# Patient Record
Sex: FEMALE | Race: WHITE | NOT HISPANIC OR LATINO | Employment: UNEMPLOYED | URBAN - METROPOLITAN AREA
[De-identification: names, ages, dates, MRNs, and addresses within clinical notes are randomized per-mention and may not be internally consistent; named-entity substitution may affect disease eponyms.]

---

## 2023-03-28 DIAGNOSIS — G43.909 MIGRAINE, UNSPECIFIED, NOT INTRACTABLE, WITHOUT STATUS MIGRAINOSUS: ICD-10-CM

## 2023-03-28 RX ORDER — NORTRIPTYLINE HYDROCHLORIDE 10 MG/1
CAPSULE ORAL
Qty: 90 CAPSULE | Refills: 1 | Status: SHIPPED | OUTPATIENT
Start: 2023-03-28 | End: 2023-09-22

## 2023-09-22 DIAGNOSIS — G43.909 MIGRAINE, UNSPECIFIED, NOT INTRACTABLE, WITHOUT STATUS MIGRAINOSUS: ICD-10-CM

## 2023-09-22 RX ORDER — NORTRIPTYLINE HYDROCHLORIDE 10 MG/1
CAPSULE ORAL
Qty: 90 CAPSULE | Refills: 1 | Status: SHIPPED | OUTPATIENT
Start: 2023-09-22 | End: 2024-05-21 | Stop reason: ALTCHOICE

## 2023-10-28 PROBLEM — R35.0 INCREASED URINARY FREQUENCY: Status: ACTIVE | Noted: 2023-10-28

## 2023-10-28 PROBLEM — R20.2 PARESTHESIA: Status: ACTIVE | Noted: 2023-10-28

## 2023-10-28 PROBLEM — L40.9 PSORIASIS: Status: ACTIVE | Noted: 2023-10-28

## 2023-10-28 PROBLEM — R42 VERTIGO: Status: ACTIVE | Noted: 2023-10-28

## 2023-10-28 PROBLEM — D22.9 MULTIPLE NEVI: Status: ACTIVE | Noted: 2023-10-28

## 2023-10-28 PROBLEM — H92.02 EARACHE ON LEFT: Status: ACTIVE | Noted: 2023-10-28

## 2023-10-28 PROBLEM — G90.A POTS (POSTURAL ORTHOSTATIC TACHYCARDIA SYNDROME): Status: ACTIVE | Noted: 2023-10-28

## 2023-10-28 PROBLEM — L40.50 PSORIATIC ARTHRITIS (MULTI): Status: ACTIVE | Noted: 2023-10-28

## 2023-10-28 PROBLEM — K59.09 CHRONIC CONSTIPATION: Status: ACTIVE | Noted: 2023-10-28

## 2023-10-28 PROBLEM — R55 NEAR SYNCOPE: Status: ACTIVE | Noted: 2023-10-28

## 2023-10-28 PROBLEM — G43.909 MIGRAINE: Status: ACTIVE | Noted: 2023-10-28

## 2023-10-28 PROBLEM — K21.9 GERD (GASTROESOPHAGEAL REFLUX DISEASE): Status: ACTIVE | Noted: 2023-10-28

## 2023-10-28 PROBLEM — M79.7 FIBROMYALGIA: Status: ACTIVE | Noted: 2023-10-28

## 2023-10-28 RX ORDER — MEDROXYPROGESTERONE ACETATE 150 MG/ML
1 INJECTION, SUSPENSION INTRAMUSCULAR
COMMUNITY
Start: 2019-01-07 | End: 2024-01-09 | Stop reason: SDUPTHER

## 2023-10-28 RX ORDER — MULTIVITAMIN
TABLET ORAL
COMMUNITY

## 2023-10-28 RX ORDER — PANTOPRAZOLE SODIUM 20 MG/1
1 TABLET, DELAYED RELEASE ORAL DAILY
COMMUNITY
Start: 2022-10-31 | End: 2023-11-21 | Stop reason: ALTCHOICE

## 2023-10-28 RX ORDER — LUBIPROSTONE 24 UG/1
24 CAPSULE, GELATIN COATED ORAL 2 TIMES DAILY
COMMUNITY
Start: 2021-04-12 | End: 2023-11-21

## 2023-10-28 RX ORDER — CLOBETASOL PROPIONATE 0.5 MG/G
CREAM TOPICAL
COMMUNITY
Start: 2021-06-25 | End: 2024-01-16 | Stop reason: WASHOUT

## 2023-10-28 RX ORDER — SUMATRIPTAN SUCCINATE 100 MG/1
TABLET ORAL
COMMUNITY
Start: 2019-03-07

## 2023-10-28 RX ORDER — PROPRANOLOL HYDROCHLORIDE 10 MG/1
10 TABLET ORAL 2 TIMES DAILY
COMMUNITY
Start: 2023-08-15 | End: 2023-11-21 | Stop reason: SINTOL

## 2023-10-28 RX ORDER — MIDODRINE HYDROCHLORIDE 5 MG/1
5 TABLET ORAL 3 TIMES DAILY
COMMUNITY
End: 2023-11-21 | Stop reason: SINTOL

## 2023-10-28 RX ORDER — LORATADINE 10 MG/1
1 TABLET ORAL DAILY
COMMUNITY
End: 2023-11-21

## 2023-10-30 ENCOUNTER — OFFICE VISIT (OUTPATIENT)
Dept: OBSTETRICS AND GYNECOLOGY | Facility: CLINIC | Age: 23
End: 2023-10-30

## 2023-10-30 VITALS
DIASTOLIC BLOOD PRESSURE: 78 MMHG | BODY MASS INDEX: 20.54 KG/M2 | WEIGHT: 131.13 LBS | SYSTOLIC BLOOD PRESSURE: 118 MMHG

## 2023-10-30 DIAGNOSIS — Z30.42 ENCOUNTER FOR MANAGEMENT AND INJECTION OF DEPO-PROVERA: Primary | ICD-10-CM

## 2023-10-30 PROCEDURE — 96372 THER/PROPH/DIAG INJ SC/IM: CPT | Performed by: OBSTETRICS & GYNECOLOGY

## 2023-10-30 RX ORDER — MEDROXYPROGESTERONE ACETATE 150 MG/ML
150 INJECTION, SUSPENSION INTRAMUSCULAR ONCE
Status: CANCELLED | OUTPATIENT
Start: 2023-10-30

## 2023-10-30 RX ORDER — MEDROXYPROGESTERONE ACETATE 150 MG/ML
150 INJECTION, SUSPENSION INTRAMUSCULAR ONCE
Status: COMPLETED | OUTPATIENT
Start: 2023-10-30 | End: 2023-10-30

## 2023-10-30 RX ADMIN — MEDROXYPROGESTERONE ACETATE 150 MG: 150 INJECTION, SUSPENSION INTRAMUSCULAR at 13:45

## 2023-10-30 NOTE — PROGRESS NOTES
Patient here today for Depo Provera injection.  Last Depo: 8/14/2023  Next Depo due: 1/15/2024-1/29/2024  Last RUDY: 5/25/2023  UPT: Not needed  LMP: No LMP recorded.  Complaints: None  Patient given depo without difficulties.  Patient provided or office supplied: Patient provided  Patient tolerated injection well.  Education offered.

## 2023-11-21 ENCOUNTER — OFFICE VISIT (OUTPATIENT)
Dept: RHEUMATOLOGY | Facility: CLINIC | Age: 23
End: 2023-11-21

## 2023-11-21 VITALS
DIASTOLIC BLOOD PRESSURE: 70 MMHG | BODY MASS INDEX: 20.81 KG/M2 | SYSTOLIC BLOOD PRESSURE: 102 MMHG | TEMPERATURE: 97.7 F | HEIGHT: 67 IN | HEART RATE: 92 BPM | WEIGHT: 132.6 LBS | OXYGEN SATURATION: 98 %

## 2023-11-21 DIAGNOSIS — M79.7 FIBROMYALGIA: Primary | ICD-10-CM

## 2023-11-21 DIAGNOSIS — G90.A POTS (POSTURAL ORTHOSTATIC TACHYCARDIA SYNDROME): ICD-10-CM

## 2023-11-21 PROBLEM — R20.9 DISTURBANCE OF SKIN SENSATION: Status: ACTIVE | Noted: 2020-07-09

## 2023-11-21 PROBLEM — M19.90 ARTHRITIS: Status: ACTIVE | Noted: 2023-11-21

## 2023-11-21 PROBLEM — R00.2 PALPITATIONS: Status: ACTIVE | Noted: 2022-02-11

## 2023-11-21 PROCEDURE — 1036F TOBACCO NON-USER: CPT | Performed by: INTERNAL MEDICINE

## 2023-11-21 PROCEDURE — 99212 OFFICE O/P EST SF 10 MIN: CPT | Performed by: INTERNAL MEDICINE

## 2023-11-21 RX ORDER — CETIRIZINE HYDROCHLORIDE 5 MG/1
10 TABLET ORAL DAILY
COMMUNITY

## 2023-11-21 RX ORDER — ALBUTEROL SULFATE 90 UG/1
2 AEROSOL, METERED RESPIRATORY (INHALATION) EVERY 4 HOURS PRN
COMMUNITY
Start: 2023-10-31

## 2023-11-21 ASSESSMENT — ENCOUNTER SYMPTOMS
DEPRESSION: 0
OCCASIONAL FEELINGS OF UNSTEADINESS: 0
LOSS OF SENSATION IN FEET: 0

## 2023-11-21 ASSESSMENT — PATIENT HEALTH QUESTIONNAIRE - PHQ9
SUM OF ALL RESPONSES TO PHQ9 QUESTIONS 1 AND 2: 0
1. LITTLE INTEREST OR PLEASURE IN DOING THINGS: NOT AT ALL
2. FEELING DOWN, DEPRESSED OR HOPELESS: NOT AT ALL

## 2023-11-21 NOTE — PROGRESS NOTES
Subjective   Patient ID: Corin Schneider is a 23 y.o. female who presents for follow up .    HPI 23-year-old female here for follow up regarding joint pain and possible raynaud's. She was started on naltrexone 4.5 mg daily for probable fibromyalgia 4/20, which has helped significantly.    Her feet in the past felt numb and tingly, but she is not really feeling this now. Dr. Covarrubias diagnosed peripheral neuropathy. He did labs and did not recommend any further testing at present. Vitamin B6 was slightly low on lab results (vitamin B6 was 17.8, with normal range 20 to 125). TSH, vitamin B12, copper and SPEP were normal.  Her feet seem to feel more tingly when she is cold.   She does note that her fingers turn somewhat white and blue with cold exposure exposure.     She complained of having joint pain since 2017. Pain involved her feet, ankles, knees and shoulders. Her pain seems to be worse for the day progresses. The pain started in her feet. She had 10-15 minutes of morning stiffness.  She had burning pain at base of neck, also radiates into shoulders. She had aching in pretibial area bilaterally.      She developed a patch on her back in 2017, for which she had a skin biopsy by dermatology.. She has a patch on right buttock. She is using topical clobetasol. She was diagnosed with psoriasis. She has some psoriasis posterior to her shoulders, also on her legs.     She tried taking methotrexate in 2017, which caused weight loss, nausea, and increased anxiety.  She tried folic acid and a medical food product called rheumate (which contains methyltetrahydrofolate, vitamin B-12, glucosamine, and curcumin.)      In 2019, she stopped methotrexate and tried hydroxychloroquine 200 mg daily. She initially thought this was helping but then no longer felt as though it is helping.   Last eye exam was 1/20.   She stopped hydroxychloroquine and gabapentin 4/20.     She also notes that her fingers and toes turn red and purple  with cold exposure. This has not recently been much of a problem .    She notes that she gave up her job at [employer], which she was finding to be stressful.  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  She notes that she knows how to do sewing and crocheting, and she helps make products for purchase.    Her cardiologist had her stop both propranolol and midodrine which she was taking for POTS.  She is having much less pain since she stopped the medication.  She no longer needs to take the naltrexone daily, she is only using it as needed.     She is not having as many migraine headaches.  She is no longer taking the nortriptyline daily .    She had a positive tilt table test done 2/22 by Dr. Mitchell ([facility]).  Her regular cardiologist is Dr. Gudino.  She gets occasional atypical chest pain.  She has not had recent syncope.     She had COVID 1/22, for which she recovered.     Echo done 11/22 left ventricle ejection fraction is 54% ±5%.  CT angio of coronary arteries done March 2023 shows normal coronary arteries. A PFO was noted with slight degree of left-to-right shunting. Left ventricle ejection fraction was estimated at 58%.     She had Pfizer COVID-19 vaccine April 25, 2021 and May 17, 2021.. She had booster January 15, 2022.     Labs March 2020: CMP normal except glucose 102, CBC normal except white blood cell count 4.2, REMINGTON negative, rheumatoid antifactor negative, citrulline antibody negative, ESR less than 1, CRP less than 0.1  Labs 5/20: Vitamin B12 and folic acid normal  Labs February 2023: CBC normal, CMP normal, ESR 2, CRP less than 0.3 (normal less than 0.9)  Labs 5/23: ESR 9, CRP less than 0.3 (less than 0.9), rheumatoid factor negative, D-dimer negative     Medical problem list:   - IBS-C (had negative colonoscopy in 2019)   - migraine headaches- migraine headaches    -Fibromyalgia   - ? Peripheral neuropathy     Surgeries: None.     Social history: Single.   Denies use of tobacco and alcohol.    "Currently a student in her final semester at [employer]. Works part-time in retail.     Family history: Paternal aunt has psoriasis.    Father- gout    ROS:  General: Denies fevers or chills.  CV: Denies  palpitations.  She gets occasional atypical chest pain Denies leg edema.  Lungs: Denies coughing or shortness of breath.  Skin: Denies rashes or nodules.  MS: Denies joint pain or joint swelling.     Objective   /70   Pulse 92   Temp 36.5 °C (97.7 °F)   Ht 1.702 m (5' 7\")   Wt 60.1 kg (132 lb 9.6 oz)   SpO2 98%   BMI 20.77 kg/m²     Physical Exam  HEENT: PERRL, EOMI  Neck: Supple, no nodes.  CV: RRR, no MGR.  Lungs: Clear, no rales or wheezes.  Abdomen: Soft, nontender. No hepatosplenomegaly.  Extremities:  No cyanosis, clubbing, or edema.  MS: No synovitis.  Skin: No rashes or nodules.      Assessment/Plan   Problem List Items Addressed This Visit             ICD-10-CM    Fibromyalgia - Primary M79.7    POTS (postural orthostatic tachycardia syndrome) G90.A           Fibromyalgia-symptoms are less severe since stopping midodrine and propranolol.  She also left her job at [employer] that she found stressful.  She is only taking the naltrexone intermittently as needed.         POTS-doing well off medication.  She had a positive tilt table test 2/22.    Raynaud's phenomenon. She is normal nailfold capillary to both fourth fingers.  Currently not very symptomatic.     Possible peripheral neuropathy- she saw Dr. Covarrubias. Vitamin B6 was slightly low but TSH, vitamin B-12, copper and SPEP were normal. Symptoms seem stable. She is currently wearing [employer] D[employer]. athletic shoes which have helped.     Atypical chest pain-she had full cardiology work-up in the past including echo November 2022 and CT angio of the coronary arteries March 2023 .  She did have a PFO with slight degree of left-to-right shunting .  Her cardiologist is Dr. Hameed.    BMI 20- stable.    Plan:  Follow-up in 6 months.         "

## 2024-01-03 ENCOUNTER — TELEPHONE (OUTPATIENT)
Dept: OBSTETRICS AND GYNECOLOGY | Facility: CLINIC | Age: 24
End: 2024-01-03

## 2024-01-09 DIAGNOSIS — Z30.42 ENCOUNTER FOR SURVEILLANCE OF INJECTABLE CONTRACEPTIVE: Primary | ICD-10-CM

## 2024-01-10 RX ORDER — MEDROXYPROGESTERONE ACETATE 150 MG/ML
150 INJECTION, SUSPENSION INTRAMUSCULAR
Qty: 1 ML | Refills: 1 | Status: SHIPPED | OUTPATIENT
Start: 2024-01-10

## 2024-01-16 ENCOUNTER — OFFICE VISIT (OUTPATIENT)
Dept: OBSTETRICS AND GYNECOLOGY | Facility: CLINIC | Age: 24
End: 2024-01-16
Payer: MEDICAID

## 2024-01-16 VITALS
HEIGHT: 67 IN | SYSTOLIC BLOOD PRESSURE: 98 MMHG | DIASTOLIC BLOOD PRESSURE: 60 MMHG | BODY MASS INDEX: 20.88 KG/M2 | WEIGHT: 133 LBS

## 2024-01-16 DIAGNOSIS — Z30.42 ENCOUNTER FOR MANAGEMENT AND INJECTION OF DEPO-PROVERA: ICD-10-CM

## 2024-01-16 PROCEDURE — 1036F TOBACCO NON-USER: CPT | Performed by: OBSTETRICS & GYNECOLOGY

## 2024-01-16 PROCEDURE — 96372 THER/PROPH/DIAG INJ SC/IM: CPT | Performed by: OBSTETRICS & GYNECOLOGY

## 2024-01-16 RX ORDER — NEBIVOLOL 5 MG/1
2.5 TABLET ORAL DAILY
COMMUNITY
Start: 2024-01-11 | End: 2024-05-21 | Stop reason: ALTCHOICE

## 2024-01-16 RX ORDER — MEDROXYPROGESTERONE ACETATE 150 MG/ML
150 INJECTION, SUSPENSION INTRAMUSCULAR ONCE
Status: COMPLETED | OUTPATIENT
Start: 2024-01-16 | End: 2024-01-16

## 2024-01-16 RX ADMIN — MEDROXYPROGESTERONE ACETATE 150 MG: 150 INJECTION, SUSPENSION INTRAMUSCULAR at 13:19

## 2024-01-16 NOTE — PROGRESS NOTES
Pt here today for Depo Provera IM Injection    Last depo: 10/30/2023  Next depo due: 4/3/2024 - 4/17/2024  Last RUDY:  5/25/2023  UPT (if done): N/A  LMP: Absent  Complaints with being on Depo Provera: None  Pt given Depo Provera without difficulty  Pt provided Depo   Pt tolerated injection well.  Education offered.    Julia Johnson MA II

## 2024-04-09 ENCOUNTER — CLINICAL SUPPORT (OUTPATIENT)
Dept: OBSTETRICS AND GYNECOLOGY | Facility: CLINIC | Age: 24
End: 2024-04-09
Payer: MEDICAID

## 2024-04-09 VITALS — WEIGHT: 135.4 LBS | DIASTOLIC BLOOD PRESSURE: 64 MMHG | SYSTOLIC BLOOD PRESSURE: 102 MMHG | BODY MASS INDEX: 21.21 KG/M2

## 2024-04-09 DIAGNOSIS — Z30.42 ENCOUNTER FOR MANAGEMENT AND INJECTION OF DEPO-PROVERA: Primary | ICD-10-CM

## 2024-04-09 PROCEDURE — 96372 THER/PROPH/DIAG INJ SC/IM: CPT

## 2024-04-09 RX ORDER — MEDROXYPROGESTERONE ACETATE 150 MG/ML
150 INJECTION, SUSPENSION INTRAMUSCULAR ONCE
Status: COMPLETED | OUTPATIENT
Start: 2024-04-09 | End: 2024-04-09

## 2024-04-09 RX ORDER — MEDROXYPROGESTERONE ACETATE 150 MG/ML
150 INJECTION, SUSPENSION INTRAMUSCULAR ONCE
Status: CANCELLED | OUTPATIENT
Start: 2024-04-09 | End: 2024-04-09

## 2024-04-09 RX ADMIN — MEDROXYPROGESTERONE ACETATE 150 MG: 150 INJECTION, SUSPENSION INTRAMUSCULAR at 15:20

## 2024-04-09 NOTE — PROGRESS NOTES
Patient here today for Depo injection    Last Depo: 1/16/2024  Next Depo due: 6/25-7/9  Last RUDY: 5/25/23  UPT result (if done): n/a  LMP: absent  Complaints with Depo: none  Patient or office supplied: pt   Patient given Depo with no difficulties  Patient tolerated injection well  Education offered

## 2024-05-21 ENCOUNTER — OFFICE VISIT (OUTPATIENT)
Dept: RHEUMATOLOGY | Facility: CLINIC | Age: 24
End: 2024-05-21
Payer: MEDICAID

## 2024-05-21 VITALS
OXYGEN SATURATION: 99 % | HEART RATE: 88 BPM | HEIGHT: 67 IN | BODY MASS INDEX: 21.6 KG/M2 | DIASTOLIC BLOOD PRESSURE: 80 MMHG | SYSTOLIC BLOOD PRESSURE: 122 MMHG | TEMPERATURE: 98 F | WEIGHT: 137.6 LBS

## 2024-05-21 DIAGNOSIS — M79.7 FIBROMYALGIA: Primary | ICD-10-CM

## 2024-05-21 PROBLEM — L40.50 PSORIATIC ARTHRITIS (MULTI): Status: RESOLVED | Noted: 2023-10-28 | Resolved: 2024-05-21

## 2024-05-21 PROCEDURE — 1036F TOBACCO NON-USER: CPT | Performed by: INTERNAL MEDICINE

## 2024-05-21 PROCEDURE — 99214 OFFICE O/P EST MOD 30 MIN: CPT | Performed by: INTERNAL MEDICINE

## 2024-05-21 PROCEDURE — 3008F BODY MASS INDEX DOCD: CPT | Performed by: INTERNAL MEDICINE

## 2024-05-21 NOTE — PATIENT INSTRUCTIONS
Since fibromyalgia symptoms have now improved and are not requiring medication at present, I recommend that you follow-up with me again only if your symptoms worsen.

## 2024-05-21 NOTE — PROGRESS NOTES
Subjective   Patient ID: Corin Schneider is a 24 y.o. female who presents for follow up .    HPI 24-year-old female here for follow up regarding joint pain and possible raynaud's.     She was started on naltrexone 4.5 mg daily for probable fibromyalgia 4/20, which did help significantly.  However, she now notes that she stopped naltrexone several months ago, without any recurrence of her previous pain symptoms.    Her feet in the past felt numb and tingly, but she is not really feeling this now. Dr. Covarrubias diagnosed peripheral neuropathy. He did labs and did not recommend any further testing at present. Vitamin B6 was slightly low on lab results (vitamin B6 was 17.8, with normal range 20 to 125). TSH, vitamin B12, copper and SPEP were normal.  She does note that her fingers turn somewhat white and blue with cold exposure exposure.     She previously complained of having joint pain since 2017. Pain involved her feet, ankles, knees and shoulders. Her pain seems to be worse for the day progresses. The pain started in her feet. She had 10-15 minutes of morning stiffness.  She had burning pain at base of neck, also radiates into shoulders. She had aching in pretibial area bilaterally.   Again, she currently notes she is not having the pain now.  She was able to stop naltrexone several months ago.    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  She finds her current job less stressful than her previous job, and less physically demanding.     She developed a patch on her back in 2017, for which she had a skin biopsy by dermatology.. She currently has a patch lateral to her right scapula . She is using topical clobetasol. She was diagnosed with psoriasis.      She tried taking methotrexate in 2017, which caused weight loss, nausea, and increased anxiety.  She tried folic acid and a medical food product called rheumate (which  contains methyltetrahydrofolate, vitamin B-12, glucosamine, and curcumin.)      In 2019, she stopped methotrexate and tried hydroxychloroquine 200 mg daily. She initially thought this was helping but then no longer felt as though it is helping.   Last eye exam was 1/20.   She stopped hydroxychloroquine and gabapentin 4/20.     She also notes that her fingers and toes turn red and purple with cold exposure. This has not recently been much of a problem .    Her cardiologist had her stop both propranolol and midodrine which she was taking for POTS.  She is having much less pain since she stopped the medication.  She no longer needs to take the naltrexone daily, she is only using it as needed.     She is not having as many migraine headaches.  She is no longer taking the nortriptyline.    She had a positive tilt table test done 2/22 by Dr. Mitchell ([facility]).  Her regular cardiologist is Dr. Gudino.  She gets occasional atypical chest pain.  She has not had recent syncope.     Echo done 11/22 left ventricle ejection fraction is 54% ±5%.  CT angio of coronary arteries done March 2023 shows normal coronary arteries. A PFO was noted with slight degree of left-to-right shunting. Left ventricle ejection fraction was estimated at 58%.     Labs March 2020: CMP normal except glucose 102, CBC normal except white blood cell count 4.2, REMINGTON negative, rheumatoid antifactor negative, citrulline antibody negative, ESR less than 1, CRP less than 0.1  Labs 5/20: Vitamin B12 and folic acid normal  Labs February 2023: CBC normal, CMP normal, ESR 2, CRP less than 0.3 (normal less than 0.9)  Labs 5/23: ESR 9, CRP less than 0.3 (less than 0.9), rheumatoid factor negative, D-dimer negative     Medical problem list:   - IBS-C (had negative colonoscopy in 2019)   - migraine headaches- migraine headaches    -Fibromyalgia   - ? Peripheral neuropathy     Surgeries: None.     Social history: Single.   Denies use of tobacco and alcohol.  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    family history: Paternal aunt has psoriasis.    Father- gout    ROS:  General: Denies fevers or chills.  CV: Denies  palpitations.  She gets occasional atypical chest pain Denies leg edema.  Lungs: Denies coughing or shortness of breath.  Skin: Denies rashes or nodules.  MS: Denies joint pain or joint swelling.     Objective   /80 (BP Location: Right arm, Patient Position: Sitting, BP Cuff Size: Small adult)   Pulse 88   Temp 36.7 °C (98 °F)   Ht 1.702 m (5' 7\")   Wt 62.4 kg (137 lb 9.6 oz)   SpO2 99%   BMI 21.55 kg/m²     Physical Exam  HEENT: PERRL, EOMI  Neck: Supple, no nodes.  CV: RRR, no MGR.  Lungs: Clear, no rales or wheezes.  Abdomen: Soft, nontender. No hepatosplenomegaly.  Extremities:  No cyanosis, clubbing, or edema.  MS: No synovitis.  Skin: No rashes or nodules.      Assessment/Plan   Problem List Items Addressed This Visit             ICD-10-CM    Fibromyalgia - Primary M79.7    BMI 21.0-21.9, adult Z68.21             Fibromyalgia-symptoms are less severe since stopping midodrine and propranolol.  She also left her job at [employer] that she found stressful.  She has not taken the naltrexone in several months, and has not had any recurrence of her symptoms.         POTS-doing well off medication.  She had a positive tilt table test 2/22.    Raynaud's phenomenon. She is normal nailfold capillary to both fourth fingers.  Currently not very symptomatic.     Possible peripheral neuropathy- she saw Dr. Covarrubias. Vitamin B6 was slightly low but TSH, vitamin B-12, copper and SPEP were normal. Symptoms seem stable. She is currently wearing [employer]. athletic shoes which have helped.     Atypical chest pain-she had full cardiology work-up in the past including echo November 2022 and CT angio of the coronary arteries March 2023 .  She did have a PFO with slight degree of left-to-right shunting .  Her cardiologist is Dr. Hameed.    BMI 21- " stable.    Plan:  Since fibromyalgia symptoms have now improved and are not requiring medication at present, I recommend that you follow-up with me again only if your symptoms worsen.

## 2024-06-14 DIAGNOSIS — Z30.42 ENCOUNTER FOR SURVEILLANCE OF INJECTABLE CONTRACEPTIVE: ICD-10-CM

## 2024-06-14 RX ORDER — MEDROXYPROGESTERONE ACETATE 150 MG/ML
150 INJECTION, SUSPENSION INTRAMUSCULAR
Qty: 1 ML | Refills: 1 | Status: SHIPPED | OUTPATIENT
Start: 2024-06-14

## 2024-06-25 ENCOUNTER — APPOINTMENT (OUTPATIENT)
Dept: OBSTETRICS AND GYNECOLOGY | Facility: CLINIC | Age: 24
End: 2024-06-25
Payer: MEDICAID

## 2024-06-25 VITALS
BODY MASS INDEX: 21.41 KG/M2 | HEIGHT: 67 IN | DIASTOLIC BLOOD PRESSURE: 77 MMHG | SYSTOLIC BLOOD PRESSURE: 109 MMHG | WEIGHT: 136.4 LBS

## 2024-06-25 DIAGNOSIS — N94.10 DYSPAREUNIA, FEMALE: Primary | ICD-10-CM

## 2024-06-25 DIAGNOSIS — Z30.42 ENCOUNTER FOR MANAGEMENT AND INJECTION OF DEPO-PROVERA: ICD-10-CM

## 2024-06-25 PROBLEM — R35.0 INCREASED URINARY FREQUENCY: Status: RESOLVED | Noted: 2023-10-28 | Resolved: 2024-06-25

## 2024-06-25 PROBLEM — M19.90 ARTHRITIS: Status: RESOLVED | Noted: 2023-11-21 | Resolved: 2024-06-25

## 2024-06-25 PROBLEM — H92.02 EARACHE ON LEFT: Status: RESOLVED | Noted: 2023-10-28 | Resolved: 2024-06-25

## 2024-06-25 PROCEDURE — 96372 THER/PROPH/DIAG INJ SC/IM: CPT | Performed by: ADVANCED PRACTICE MIDWIFE

## 2024-06-25 PROCEDURE — 99395 PREV VISIT EST AGE 18-39: CPT | Performed by: ADVANCED PRACTICE MIDWIFE

## 2024-06-25 PROCEDURE — 3008F BODY MASS INDEX DOCD: CPT | Performed by: ADVANCED PRACTICE MIDWIFE

## 2024-06-25 PROCEDURE — 1036F TOBACCO NON-USER: CPT | Performed by: ADVANCED PRACTICE MIDWIFE

## 2024-06-25 RX ORDER — MEDROXYPROGESTERONE ACETATE 150 MG/ML
150 INJECTION, SUSPENSION INTRAMUSCULAR ONCE
Status: COMPLETED | OUTPATIENT
Start: 2024-06-25 | End: 2024-06-25

## 2024-06-25 NOTE — PROGRESS NOTES
"Salma Schneider \"Reyna\" is a 24 y.o. female who is here for a routine well woman exam.     Concerns today:  Discuss family planning and contraception    No LMP recorded (lmp unknown). Patient has had an injection. Pt on Depo injections every 3 months.  Periods are  absent , lasting 0 days.   Dysmenorrhea:mild, occurring 1 or so before Depo due .   Cyclic symptoms include  mild back pain .     Sexual Activity: sexually active, male partners; Patient reports 1 partners in the last 12 months.  Pain with intercourse? Yes, soreness, especially after 20 mins, denies vaginal dryness  Loss of desire? No   Able to have an orgasm? Yes     History of prior STI: none    Current contraception: Depo-Provera injections    Last pap:   History of abnormal Pap smear: no  Treatment for cervical dysplasia: no  Received HPV vaccine series?: no, declines    Family history of breast cancer or ovarian cancer: yes - JACQUELINE had breast cancer in her early 40s    Menstrual History:  OB History          0    Para   0    Term   0       0    AB   0    Living   0         SAB   0    IAB   0    Ectopic   0    Multiple   0    Live Births   0                 Menarche age: 9  No LMP recorded (lmp unknown). Patient has had an injection.     Objective   /77   Ht 1.702 m (5' 7\")   Wt 61.9 kg (136 lb 6.4 oz)   LMP  (LMP Unknown)   BMI 21.36 kg/m²     Physical Exam  Constitutional:       Appearance: Normal appearance.   HENT:      Head: Normocephalic.      Nose: Nose normal.      Mouth/Throat:      Mouth: Mucous membranes are moist.      Pharynx: Oropharynx is clear.   Eyes:      Conjunctiva/sclera: Conjunctivae normal.   Cardiovascular:      Rate and Rhythm: Normal rate and regular rhythm.   Pulmonary:      Effort: Pulmonary effort is normal.      Breath sounds: Normal breath sounds.   Chest:      Comments: Declines CBE, pt has no concerns   Abdominal:      General: Abdomen is flat.      Palpations: Abdomen is soft. "   Genitourinary:     Comments: Pt declines pelvic exam, pt denies concerns at this time   Musculoskeletal:         General: Normal range of motion.      Cervical back: Normal range of motion and neck supple.   Skin:     General: Skin is warm and dry.   Neurological:      Mental Status: She is alert.   Psychiatric:         Mood and Affect: Mood normal.         Behavior: Behavior normal.          Assessment/Plan   Normal well woman exam  Continue healthy lifestyle habits  Consider taking a multivitamin daily  Continue recommended women's health screenings  Pap UTD, due 2026  Birth control maintenance  Continue Depo shots until pregnancy desired  Discussed possibility of delayed fertility  Discussed switching to alternate form of birth control, pt declines  Family planning  Advised stopping Depo 3-6 months prior to when she would like to start trying to conceive.  Start PNVs at least 3 months prior to trying  Start tracking periods with maryanne to make timing of ovulation and intercourse easier      Return to care for annual exam or sooner as needed.    JAILYN Pimentel-DAVID

## 2024-06-25 NOTE — PROGRESS NOTES
Pt. here today for Depo Provera injection  Last Depo 4/9/2024  Next Depo due 9/10-9/24/2024  Last RUDY today 6/25/2024  UPT n/a  LMP n/a  Complaints with being on Depo Provera  Pt. given Depo Provera without difficulties  Patient brought in.  Pt. tolerated injection well.  Education offered.

## 2024-08-20 NOTE — PROGRESS NOTES
Division of Minimally Invasive Gynecologic Surgery  [facility]    09/03/24 Gynecology Visit    CC:   Chief Complaint   Patient presents with    Endometriosis     NPV=  Endometriosis    Chaperone Declined: MADAN Smith is a 24 y.o. referred to our practice by Candice Healy    Patient using depo for contraception and menstrual regulation. Amenorrheic on depo. Prior to depo periods lasted 5 days. Pt report leg, back and pelvic cramping unrelieved with midol. Has to miss school the first 1-2 days.   Now that she is amenorrheic on depo she largely has no pain. Now has a back ache leading up to when her depo is due. On average 1 week prior to next depo dose.      GI symptoms: IBS largely in remission  Urinary symptoms: none  Sexual activity: yes, has pain with initial and deep penetration. Vaginal walls feel tender. Occasionally has deeper pelvic pain.     Prior Therapies: depo, last in June. OCPs-stopped due to migraines    Imaging: none  Labs: n/a     GYN Hx  Gynecologic history:  Contraception/menstrual regulation: Depo  Desires Childbearing: possibly  Last pap: NILM neg HPV 2023    OBHx: G0  Pertinent PMH: POTs, IBS  Pertient PSH: no surgeries    PMHx, PSHx, SHx, Allergies, and Medications updated in Epic.  Past Medical History:   Diagnosis Date    Migraine     Urinary tract infection, site not specified     UTI (lower urinary tract infection)     Past Surgical History:   Procedure Laterality Date    COLPOSCOPY  July 2019     Allergies   Allergen Reactions    Midodrine Shortness of breath    Propranolol Chills, Other and Shortness of breath    Amoxicillin Unknown    Nitroglycerin Unknown    Penicillins Unknown    Sulfa (Sulfonamide Antibiotics) Unknown    Sulfamethoxazole-Trimethoprim Unknown       Current Outpatient Medications:     albuterol 90 mcg/actuation inhaler, Inhale 2 puffs every 4 hours if needed for wheezing., Disp: , Rfl:      cetirizine (ZyrTEC) 5 mg tablet, Take 2 tablets (10 mg) by mouth once daily., Disp: , Rfl:     cyclobenzaprine (Flexeril) 5 mg tablet, Take 1 tablet (5 mg) by mouth 3 times a day for 10 days., Disp: 30 tablet, Rfl: 0    medroxyPROGESTERone 150 mg/mL injection, INJECT 1 ML (150 MG) INTO THE MUSCLE EVERY 12 WEEKS., Disp: 1 mL, Rfl: 1    multivitamin tablet, Take by mouth., Disp: , Rfl:     SUMAtriptan (Imitrex) 100 mg tablet, TAKE 1 TABLET AT ONSET OF MIGRAINE HEADACHE. MAY REPEAT IN 2 HOURS IF NEEDED., Disp: , Rfl:   Social History     Socioeconomic History    Marital status: Significant Other     Spouse name: Not on file    Number of children: Not on file    Years of education: Not on file    Highest education level: Not on file   Occupational History    Not on file   Tobacco Use    Smoking status: Never     Passive exposure: Never    Smokeless tobacco: Never   Vaping Use    Vaping status: Some Days    Substances: CBD   Substance and Sexual Activity    Alcohol use: Never    Drug use: Never    Sexual activity: Yes     Partners: Male     Birth control/protection: Injection   Other Topics Concern    Not on file   Social History Narrative    Not on file     Social Determinants of Health     Financial Resource Strain: Not on file   Food Insecurity: Not on file   Transportation Needs: Not on file   Physical Activity: Not on file   Stress: Not on file   Social Connections: Not on file   Intimate Partner Violence: Not on file   Housing Stability: Not on file     Family History   Problem Relation Name Age of Onset    Cholelithiasis Mother Shameka     Heart disease Mother Shameka     Other (gastroesophageal reflux disease) Father      Other (malignant neoplam of colon) Paternal Grandmother      Blood clot Maternal Grandfather Art     Heart disease Maternal Grandfather Art     Diabetes Maternal Grandmother Virginia     Heart disease Maternal Grandmother Virginia     Breast cancer Mother's Sister Skyla     Cancer Mother's Sister  "Skyla     Diabetes Mother's Sister Eryn     Heart disease Mother's Sister Eryn     Heart disease Mother's Brother Martin      OB History          0    Para   0    Term   0       0    AB   0    Living   0         SAB   0    IAB   0    Ectopic   0    Multiple   0    Live Births   0                    ROS: reviewed and negative    PE:/70   Ht 1.702 m (5' 7\")   Wt 63 kg (139 lb)   BMI 21.77 kg/m²   Gen: NAD  Psych: AAOx3  Skin: no lesions  Pulm: nonlabored breathing, normal respiratory effort  Cards: normal peripheral perfusion  Lymph: no LAD in axilla or groin  GI: soft, non-tender, non-distended, no rebound/guarding, no masses  :  External Genitalia: vulva without lesions, normal hair distribution  Urethral meatus: normal size and without massesBladder: non-tender, no masses or tenderness  Vagina: normal discharge, no lesions or masses. Significant tenderness with palpation of perineal body, right puborectalis, pubococcygeus, iliococcygeus and obturator internus. Contracted hypertonic bands. Left levator tenderness significant less than right side. Negative qtip test  Cervix: without discharge or lesions, no cervical masses, no CMT  Uterus: mildly ttp, mobile, normal sized  Adnexa: non tender and not enlarged  Anus/Perineum: normal appearance      A/P: Corin Schneider is a 24 y.o. with chronic pelvic pain, high tone pelvic floor and dyspareunia    High tone pelvic floor  The patient was counseled that pelvic pain may have many sources, and at times, pelvic floor muscle spasm can be a major contributor. The origin of pelvic floor muscle spasm can be multifactorial, including primary, reactive to a different pain source, trauma, or even part of a centralized pain syndrome. With regard to pelvic floor muscle spasm, her clinical history and exam findings support this diagnosis. Our primary intervention is typically pelvic floor physical therapy, and for some patients, the addition of local or " orally administered muscle relaxants, or centrally acting pain medications (e.g. Gabapentin, Cymbalta) can be effective. At this point, I will refer her for pelvic floor physical therapy and a prescription for flexeril was provided.    Pelvic pain  - We  discussed the multiple etiologies of chronic pelvic pain, including endometriosis, adenomyosis, GI etiologies, MSK etiologies, and centralization of pain.  - Her exam is most demonstrative of pelvic floor dysfunction but discussed cannot rule out possibility of endometriosis. Depo has largely controlled menstrual related pain and now most pain is related to sex  -Regarding endometriosis, we discussed the natural history of the disease, superficial endometriosis, endometriomas, and deeply infiltrating disease. Extensively reviewed the medical and surgical treatment options available for endometriosis including NSAIDs, OCPs, progestin therapy (Mirena IUD, depo-provera, norethindrone), GnRH agonists, GnRH antagonists, aromatase inhibitors (i.e. Letrozole), Orilissa (Elagolix tablets), as well as the surgical options including excision of endometriosis. We reviewed at length the modern treatment of endometriosis, recommending preservation of ovaries if normal, despite the presence of extensive disease due to morbidity and mortality benefits.  - We discussed the natural history of endometriosis and the fact that hormonal suppression is thought to have a role in slowing disease progression and managing symptoms of endometriosis, but cannot definitively reverse or eliminate endometriosis.   - continue depo provera  - US to evaluate for structural causes of pain  - Plan to reassess pain after PFPT and flexeril. Patient would like to avoid surgery and would like to assess pain response to pelvic floor therapy      RTC 3 months    Ladonna Sepulveda MD  Division of Minimally Invasive Gynecologic Surgery  [facility]

## 2024-09-03 ENCOUNTER — APPOINTMENT (OUTPATIENT)
Dept: OBSTETRICS AND GYNECOLOGY | Facility: CLINIC | Age: 24
End: 2024-09-03
Payer: MEDICAID

## 2024-09-03 VITALS
WEIGHT: 139 LBS | BODY MASS INDEX: 21.82 KG/M2 | HEIGHT: 67 IN | SYSTOLIC BLOOD PRESSURE: 112 MMHG | DIASTOLIC BLOOD PRESSURE: 70 MMHG

## 2024-09-03 DIAGNOSIS — N94.10 DYSPAREUNIA, FEMALE: Primary | ICD-10-CM

## 2024-09-03 DIAGNOSIS — M62.89 HIGH-TONE PELVIC FLOOR DYSFUNCTION: ICD-10-CM

## 2024-09-03 PROCEDURE — 99214 OFFICE O/P EST MOD 30 MIN: CPT | Performed by: STUDENT IN AN ORGANIZED HEALTH CARE EDUCATION/TRAINING PROGRAM

## 2024-09-03 PROCEDURE — 3008F BODY MASS INDEX DOCD: CPT | Performed by: STUDENT IN AN ORGANIZED HEALTH CARE EDUCATION/TRAINING PROGRAM

## 2024-09-03 PROCEDURE — 1036F TOBACCO NON-USER: CPT | Performed by: STUDENT IN AN ORGANIZED HEALTH CARE EDUCATION/TRAINING PROGRAM

## 2024-09-03 RX ORDER — CYCLOBENZAPRINE HCL 5 MG
5 TABLET ORAL 3 TIMES DAILY
Qty: 30 TABLET | Refills: 0 | Status: SHIPPED | OUTPATIENT
Start: 2024-09-03 | End: 2024-09-13

## 2024-09-03 ASSESSMENT — PAIN SCALES - GENERAL: PAINLEVEL: 0-NO PAIN

## 2024-09-05 ENCOUNTER — HOSPITAL ENCOUNTER (OUTPATIENT)
Dept: RADIOLOGY | Facility: HOSPITAL | Age: 24
Discharge: HOME | End: 2024-09-05
Payer: MEDICAID

## 2024-09-05 DIAGNOSIS — N94.10 DYSPAREUNIA, FEMALE: ICD-10-CM

## 2024-09-05 PROCEDURE — 76856 US EXAM PELVIC COMPLETE: CPT

## 2024-09-17 ENCOUNTER — APPOINTMENT (OUTPATIENT)
Dept: OBSTETRICS AND GYNECOLOGY | Facility: CLINIC | Age: 24
End: 2024-09-17
Payer: MEDICAID

## 2024-09-17 VITALS
SYSTOLIC BLOOD PRESSURE: 122 MMHG | WEIGHT: 141 LBS | HEIGHT: 67 IN | BODY MASS INDEX: 22.13 KG/M2 | DIASTOLIC BLOOD PRESSURE: 78 MMHG

## 2024-09-17 DIAGNOSIS — Z30.42 ENCOUNTER FOR MANAGEMENT AND INJECTION OF DEPO-PROVERA: ICD-10-CM

## 2024-09-17 DIAGNOSIS — Z30.42 ENCOUNTER FOR SURVEILLANCE OF INJECTABLE CONTRACEPTIVE: ICD-10-CM

## 2024-09-17 PROCEDURE — 96372 THER/PROPH/DIAG INJ SC/IM: CPT | Performed by: ADVANCED PRACTICE MIDWIFE

## 2024-09-17 PROCEDURE — 3008F BODY MASS INDEX DOCD: CPT | Performed by: ADVANCED PRACTICE MIDWIFE

## 2024-09-17 RX ORDER — MEDROXYPROGESTERONE ACETATE 150 MG/ML
150 INJECTION, SUSPENSION INTRAMUSCULAR
Qty: 1 ML | Refills: 1 | Status: CANCELLED | OUTPATIENT
Start: 2024-09-17

## 2024-09-17 RX ORDER — MEDROXYPROGESTERONE ACETATE 150 MG/ML
150 INJECTION, SUSPENSION INTRAMUSCULAR ONCE
Status: COMPLETED | OUTPATIENT
Start: 2024-09-17 | End: 2024-09-17

## 2024-09-17 NOTE — PROGRESS NOTES
Patient here for Depo injection.   Last injection given:  Medication supplied by office/Patient brought in own medication: Patient supplied  Injection given as documented. Patient tolerated well. Education provided.  UPT: Not needed  Last annual exam: 6/25/2024  LMP: absent  Patient to RTC between 12/3/2024-12/17/2024 for Depo   Patient verbalized understanding and all questions answered.

## 2024-09-18 DIAGNOSIS — Z30.42 ENCOUNTER FOR SURVEILLANCE OF INJECTABLE CONTRACEPTIVE: ICD-10-CM

## 2024-09-18 RX ORDER — MEDROXYPROGESTERONE ACETATE 150 MG/ML
150 INJECTION, SUSPENSION INTRAMUSCULAR
Qty: 1 ML | Refills: 3 | Status: SHIPPED | OUTPATIENT
Start: 2024-09-18

## 2024-10-15 ENCOUNTER — EVALUATION (OUTPATIENT)
Dept: PHYSICAL THERAPY | Facility: CLINIC | Age: 24
End: 2024-10-15
Payer: MEDICAID

## 2024-10-15 DIAGNOSIS — N94.10 DYSPAREUNIA, FEMALE: ICD-10-CM

## 2024-10-15 PROCEDURE — 97110 THERAPEUTIC EXERCISES: CPT | Mod: GP

## 2024-10-15 PROCEDURE — 97161 PT EVAL LOW COMPLEX 20 MIN: CPT | Mod: GP

## 2024-10-15 ASSESSMENT — PAIN - FUNCTIONAL ASSESSMENT: PAIN_FUNCTIONAL_ASSESSMENT: 0-10

## 2024-10-15 ASSESSMENT — PAIN SCALES - GENERAL: PAINLEVEL_OUTOF10: 5 - MODERATE PAIN

## 2024-10-15 NOTE — PROGRESS NOTES
"Physical Therapy    Physical Therapy Evaluation    Patient Name: Corin Schneider \"Reyna\"  MRN: [id number]  Today's Date: 10/15/2024   confirmed verbally by patient.    Time Entry:   Time Calculation  Start Time: 1008  Stop Time: 1100  Time Calculation (min): 52 min  PT Evaluation Time Entry  PT Evaluation (Low) Time Entry: 20  PT Therapeutic Procedures Time Entry  Therapeutic Exercise Time Entry: 32                    Reason for Visit:  Referred by: Ladonna Sepulveda MD (9/3/2024)   Referral DX: Dyspareunia, female [N94.10]     Insurance:  Visit: #1  Authorized: to be determined  Payor/Plan:   [employer] [facility] [facility] [facility]   [employer] 30 VISITS % COVERAGE AUTH REQ AFTER 30 VISITS     Current Problem  1. Dyspareunia, female  Referral to Physical Therapy          Subjective   Date of Onset: May 2024  Chief Complaint: pain with intercourse    Patient is a 24-year-old female presenting to PT with reports of painful sex difficulty engaging in sexual intercourse and sexual activities with fiance. Patient reports she did not become sexually active until about a 2 years and she always had some pain following penetration but recently the past few months it has become unbearable pain following sex with long \"flare ups\". Patient reports she is unsure why this is happening. Sometimes she reports flare ups are so bad she is unable to sit for a couple of days following intercourse. Is getting  in two weeks and would like this to no longer be a problem for them.    Additional history of cyst on labia minora- which has been cleared to be benign.   Currently on birth control: depo shot: progesterone, off estrogen pill due to migraines.   Hx of \"crooked tailbone\" found on imaging when she was little.   Hx of bad cramps as a child in lower abdomen. Unsure why.   Denies any changes in sensation or pain along external vaginal tissue.     Bladder Screen:  4-5 times voids/ day, no nocturia  No " "pain with urination  No straining, steady stream  68 oz water a day    Bowel Screen:  Does struggle on/off with constipation  No straining reported with BM  Sometimes has to injest a stimulant (grease) to induce BM  No leakage/incontinence reported  Mild pain at times in lower abdomen    Sexual Health Screen:   Pain with intercourse, penile penetration initially and when deeper  Has never utilized a tampon before due to difficulty with pain/insertion  No history of sexual trauma  Difficulty with climax  No difficulty with vaginal dryness     Recent Imagine/Lab Work:  US PELVIS TRANSABDOMINAL WITH TRANSVAGINAL:  \"IMPRESSION:  1.  Mild diffuse heterogeneity uterine myometrium with some increased  vascularity possibly representing adenomyosis. No discrete myometrial  lesion demonstrated.  2. No abnormal endometrial thickening.  3. Normal size of the ovaries containing physiologic cysts/follicles.  Blood flow demonstrated to both ovaries.\"    Patient stated goals for treatment: \"to have less pain\"    Reviewed medical screening history form with patient: Yes, none other likely to impact care.      Barriers Impacting Care:  None.    Precautions:  Precautions  STEADI Fall Risk Score (The score of 4 or more indicates an increased risk of falling): 0  Precautions Comment: Hx of POTS, migraine, fibromyalgia      Pain:  Pain Assessment: 0-10  0-10 (Numeric) Pain Score: 5 - Moderate pain  Pain Description: perineum, lower abdomen      Objective     Range of Motion:   Pelvic Floor AROM  Contract/Relax/Bulge  No/ No/ No    Patient demonstrates tons of difficulty engaging movement of perineum with PFM.     Hip: all WNL, slight pain with flexion over pressure in perineum  (Right)   Flexion   Extension  Abduction  IR  ER    (Left)  Flexion  Extension  Abduction  IR  ER     Strength:   Patient proves explicit verbal and continual consent during internal or external hands on work today.  Internal Pelvic Floor Strength Assessment " P/E//R///F; unable to produce Kegel contraction today.      Palpation:   Patient proves explicit verbal and continual consent during internal or hands on work today.  Internal Pelvic Floor Palpation  Vaginal Vestibule; no pain, tissue color WNL  Superficial Layer: high tone, pain with finger insertion  Intermediate Layer; high tone, pain   Deep Layer (Levator Ani/Obturator Internus); high tone right side> left, pain     Adductors Bilaterally: +TTP proximal palpation      TREATMENT  Educated patient on course of treatment.   Discussed PFM and high tone PFM dysfunction.     THERAPEUTIC EXERCISE:   Introduced:  See below for introduction to stretches for home. Discussed importance of breathing deeply, in nose out through mouth, for exercises.   Pt. Education: Discussed ordering vaginal dilators for use and for internal stretching at home. Ordering intimate bill small set.     OP Education: HEP: Patient verbalizes and demonstrates understanding of home exercises. Patient will contact writer if with questions or if condition worsens.     Access Code: [email or web address]  URL: [email or web address]  Date: 10/15/2024  Prepared by: Marilou Canales    Home Exercises:   - Supine Pelvic Floor Stretch  - 1 x daily - 7 x weekly - 1 sets - 10 reps - 30 second hold  - Deep Squat with Pelvic Floor Relaxation  - 1 x daily - 7 x weekly - 1 sets - 10 reps - 20 seconds hold  - Diaphragmatic Breathing in Supported Child's Pose with Pelvic Floor Relaxation  - 1 x daily - 7 x weekly - 1 sets - 1 reps - 5 minute hold    Charges: [id number], [id number]    Outcome Measures:  Other Measures  Other Outcome Measures: Female NIH-CPSI: 19/44     Assessment:   PT Diagnosis (Initial Evaluation 10/15/2024): Patient presents with pain, decreased mobility, strength, and function of pelvic floor muscles secondary to signs/symptoms suggestive of high tone pelvic floor dysfunction. Skilled PT required to maximize functional outcome. Likely to benefit  from skilled care to address concerns with focus on deep breathing/relaxation, internal/external stretching, and other modalities as needed.     Plan:  Treatment/Interventions: Aquatic therapy, Biofeedback, Cryotherapy, Dry needling, Education/ Instruction, Electrical stimulation, Hot pack, Manual therapy, Neuromuscular re-education, Self care/ home management, Therapeutic activities, Therapeutic exercises, Taping techniques  PT Plan: Skilled PT  PT Frequency: 1 time per week  Duration: 8 weeks  Rehab Potential: Good  Plan of Care Agreement: Patient    Next Visit Plan: Progress as tolerated. Begin dilator training.     Goals: update as needed  Patient will report confidence and independence with dilator training at home in 2 weeks.   Patient will report BM at least every 2 days in 8 weeks.   Patient will report pain-free sexual intercourse with penile penetration in 8 weeks.   Patient will report Female NIH-CPSI <5/44 in 8 weeks.   Patient will be independent with HEP.     Isabel Canales PT, DPT

## 2024-10-23 ENCOUNTER — TREATMENT (OUTPATIENT)
Dept: PHYSICAL THERAPY | Facility: CLINIC | Age: 24
End: 2024-10-23
Payer: MEDICAID

## 2024-10-23 DIAGNOSIS — M62.89 HIGH-TONE PELVIC FLOOR DYSFUNCTION: Primary | ICD-10-CM

## 2024-10-23 PROCEDURE — 97535 SELF CARE MNGMENT TRAINING: CPT | Mod: GP

## 2024-10-23 PROCEDURE — 97110 THERAPEUTIC EXERCISES: CPT | Mod: GP

## 2024-10-23 PROCEDURE — 97140 MANUAL THERAPY 1/> REGIONS: CPT | Mod: GP

## 2024-10-23 ASSESSMENT — PAIN SCALES - GENERAL: PAINLEVEL_OUTOF10: 3

## 2024-10-23 NOTE — PROGRESS NOTES
"Physical Therapy    Physical Therapy Treatment    Patient Name: Corin Schneider  MRN: [id number]  Today's Date: 10/23/2024    Time Entry:   Time Calculation  Start Time:   Stop Time:   Time Calculation (min): 58 min     PT Therapeutic Procedures Time Entry  Manual Therapy Time Entry: 30  Therapeutic Exercise Time Entry: 10  Self-Care/Home Mgmt Trainin                   Current Problem  1. High-tone pelvic floor dysfunction              Subjective    I am doing well, I feel like my pelvic floor muscles has been better relaxed prior to intercourse with use of happy baby stretch prior.   Less pain with insertion of penis when I did some of the stretches before. Excited to get  next week.     I have my vaginal dilators, ready to trial.     Patient reports a \"spot\" on inner labia that she realized she had in mirror the other week. Would like therapist to briefly look.       Precautions  Precautions  STEADI Fall Risk Score (The score of 4 or more indicates an increased risk of falling): 0  Precautions Comment: Hx of POTS, migraine, fibromyalgia  Pain  Pain Assessment  0-10 (Numeric) Pain Score: 3    Objective   Observation/Palpation:  Noted <.5cm size brown/black spot along right vulvar tissue adjacent to posterior fourchette of vaginal opening, irregular borders, not raised, no discharge bleeding or redness. No pain with palpation of skin spot +TTP with palpation of perirenal muscle body.    Treatments:    TREATMENT  Educated patient on course of treatment.   Discussed PFM and high tone PFM dysfunction.     MANUAL: x2  Introduction of vaginal dilator (light blue from medium intimate bill pack); therapist inserted while patient performed pelvic lengthening with diaphragmatic breathing. Process in total took 30 minutes. Patient spent 10 minutes with vaginal dilator completely inserted, supine with legs over small bolster for comfort. Additional use of soft meditative music utilized.     THERAPEUTIC " "EXERCISE: x1  Introduced:  Diaphragmatic breathing with PFM lengthening; 30 reps. During insertion of vaginal dilator as well.     SELF MANAGEMENT: x1  Discussed importance of lubrication and proper warm up prior to penetrative sexual intercourse; 10 minutes.   Discussed importance of continuing to monitor spot on vulvar tissue and to reach out to OBGYN as soon as possible for further evaluation.       OP Education: HEP: Patient verbalizes and demonstrates understanding of home exercises. Patient will contact writer if with questions or if condition worsens.      Access Code: [email or web address]  URL: [email or web address]  Date: 10/15/2024  Prepared by: Marilou Canales     Home Exercises:  - Supine Pelvic Floor Stretch  - 1 x daily - 7 x weekly - 1 sets - 10 reps - 30 second hold  - Deep Squat with Pelvic Floor Relaxation  - 1 x daily - 7 x weekly - 1 sets - 10 reps - 20 seconds hold  - Diaphragmatic Breathing in Supported Child's Pose with Pelvic Floor Relaxation  - 1 x daily - 7 x weekly - 1 sets - 1 reps - 5 minute hold     Charges: [id number], [id number], [id number]     Outcome Measures:  Other Measures  Other Outcome Measures: Female NIH-CPSI: 19/44      Assessment:   PT Diagnosis (Initial Evaluation 10/15/2024): Patient presents with pain, decreased mobility, strength, and function of pelvic floor muscles secondary to signs/symptoms suggestive of high tone pelvic floor dysfunction. Skilled PT required to maximize functional outcome. Likely to benefit from skilled care to address concerns with focus on deep breathing/relaxation, internal/external stretching, and other modalities as needed.     Today (10/23): Patient demonstrates ability to tolerate vaginal dilator internal stretching for 10 minutes. Continues to benefit from skilled care focused on high tone pelvic pain and strategies to reduce tone. Patient is advised to monitor \"spot\" observed on vulvar tissue as well as to reach out to OBGYN for further " "evaluation of \"spot\" to rule out any abnormality.     Plan:  Treatment/Interventions: Aquatic therapy, Biofeedback, Cryotherapy, Dry needling, Education/ Instruction, Electrical stimulation, Hot pack, Manual therapy, Neuromuscular re-education, Self care/ home management, Therapeutic activities, Therapeutic exercises, Taping techniques  PT Plan: Skilled PT  PT Frequency: 1 time per week  Duration: 8 weeks  Rehab Potential: Good  Plan of Care Agreement: Patient     Next Visit Plan: Progress as appropriate and tolerated. Trial biofeedback down training.      Goals: update as needed  Patient will report confidence and independence with dilator training at home in 2 weeks.   Patient will report BM at least every 2 days in 8 weeks.   Patient will report pain-free sexual intercourse with penile penetration in 8 weeks.   Patient will report Female NIH-CPSI <5/44 in 8 weeks.   Patient will be independent with HEP.     Isabel Canales PT, DPT  "

## 2024-11-05 ENCOUNTER — TREATMENT (OUTPATIENT)
Dept: PHYSICAL THERAPY | Facility: CLINIC | Age: 24
End: 2024-11-05
Payer: MEDICAID

## 2024-11-05 DIAGNOSIS — M62.89 HIGH-TONE PELVIC FLOOR DYSFUNCTION: Primary | ICD-10-CM

## 2024-11-05 DIAGNOSIS — N94.10 DYSPAREUNIA, FEMALE: ICD-10-CM

## 2024-11-05 PROCEDURE — 97140 MANUAL THERAPY 1/> REGIONS: CPT | Mod: GP

## 2024-11-05 PROCEDURE — 97110 THERAPEUTIC EXERCISES: CPT | Mod: GP

## 2024-11-05 ASSESSMENT — PAIN SCALES - GENERAL: PAINLEVEL_OUTOF10: 2

## 2024-11-05 NOTE — PROGRESS NOTES
"Physical Therapy    Physical Therapy Treatment    Patient Name: Corin Schneider  MRN: [id number]  Today's Date: 11/5/2024    Time Entry:   Time Calculation  Start Time: 1332  Stop Time: 1429  Time Calculation (min): 57 min     PT Therapeutic Procedures Time Entry  Manual Therapy Time Entry: 30  Therapeutic Exercise Time Entry: 27                   Current Problem  1. High-tone pelvic floor dysfunction [M62.89]        2. Dyspareunia, female [N94.10]            Subjective    I feel like stretches are helping.    Right now the most tenderness is at the end of intercourse.  Two weeks ago, I felt it in my stomach/lower abdomen hurting a little following dilator.     Otherwise I'm doing well. Will follow up with OBGYN today about vulvar spot.      Precautions  Precautions  STEADI Fall Risk Score (The score of 4 or more indicates an increased risk of falling): 0  Precautions Comment: Hx of POTS, migraine, fibromyalgia  Pain  Pain Assessment  0-10 (Numeric) Pain Score: 2    Objective     Treatments:    TREATMENT  Educated patient on course of treatment.   Discussed PFM and high tone PFM dysfunction.     MANUAL: x2  Skin rolling abdominal and visceral massage; focus on release of diaphragm, hip flexors, and TA. 20 minutes  Colonic pattern massage; I love you pattern; 10 minutes.   Diaphragmatic release seated; 5x on exhale.  Patient supine, legs over small bolster, eyes closed focused on breathing, meditation music playing in background, lights dimmed, heat applied to pelvic region. Clothes donned, gloves donned on therapist hands.    THERAPEUTIC EXERCISE: x1  Diaphragmatic breathing with PFM lengthening; 30 reps.   Modified marbin hip flexor stretch bilateral; 60 second holds; 4 times. (Left hip flexor worse than right)  Seated thoracic extension stretch on exhale over back of chair; 10 reps.     SELF MANAGEMENT: x1   Discussed progression of dilators at home.  Discussed importance of \"calming\" nervous system and promoting " "relaxing environment for breathing and stretching.   Taught and provided hand out of colonic massage for home.     OP Education: HEP: Patient verbalizes and demonstrates understanding of home exercises. Patient will contact writer if with questions or if condition worsens.      Access Code: [email or web address]  URL: [email or web address]  Date: 11/05/2024  Prepared by: Marilou Canales    Exercises:  - Supine Pelvic Floor Stretch  - 1 x daily - 7 x weekly - 1 sets - 10 reps - 30 second hold  - Deep Squat with Pelvic Floor Relaxation  - 1 x daily - 7 x weekly - 1 sets - 10 reps - 20 seconds hold  - Diaphragmatic Breathing in Supported Child's Pose with Pelvic Floor Relaxation  - 1 x daily - 7 x weekly - 1 sets - 1 reps - 5 minute hold  - Modified Adama Stretch  - 1 x daily - 7 x weekly - 1 sets - 10 reps - 60 second hold  - Lower Trunk Rotations  - 1 x daily - 7 x weekly - 3 sets - 10 reps     Charges: [id number], [id number], [id number]     Outcome Measures:  Other Measures  Other Outcome Measures: Female NIH-CPSI: 19/44      Assessment:   PT Diagnosis (Initial Evaluation 10/15/2024): Patient presents with pain, decreased mobility, strength, and function of pelvic floor muscles secondary to signs/symptoms suggestive of high tone pelvic floor dysfunction. Skilled PT required to maximize functional outcome. Likely to benefit from skilled care to address concerns with focus on deep breathing/relaxation, internal/external stretching, and other modalities as needed.     Today: Patient progressing well and as expected. Noted increase \"tightness\" of left hip flexors today as well as palpable tenderness and stool back up in descending/sigmoid colon. Patient encouraged to continue with colonic massage at home, breathing, stretches, and dilator use. Patient continues to benefit from skilled care to address concerns.      Plan:  Treatment/Interventions: Aquatic therapy, Biofeedback, Cryotherapy, Dry needling, Education/ Instruction, " "Electrical stimulation, Hot pack, Manual therapy, Neuromuscular re-education, Self care/ home management, Therapeutic activities, Therapeutic exercises, Taping techniques  PT Plan: Skilled PT  PT Frequency: 1 time per week  Duration: 8 weeks  Rehab Potential: Good  Plan of Care Agreement: Patient     Next Visit Plan: Progress as appropriate and tolerated.    Message to OBGYN:  \"\"Good morning, my name is Isabel I'm a pelvic floor therapist working with mutual patient Reyna Schneider. She is progressing well in pelvic PT with work on high tension pelvic floor and muscle weakness. Our last visit she reported strange spot noted on inner vulvar tissue, I observed spot and recommended she contact you as soon as possible to get area further inspected/evaluated. I'm concerned with how this area looks. \"Noted <.5cm size brown/black spot along right vulvar tissue adjacent to posterior fourchette of vaginal opening, irregular borders, not raised, no discharge bleeding or redness. No pain with palpation of skin spot +TTP with palpation of perirenal muscle body.\" Just giving heads up she will be contacting your office shortly. Thanks! -Isabel\"\"     Goals: update as needed  Patient will report confidence and independence with dilator training at home in 2 weeks.   Patient will report BM at least every 2 days in 8 weeks.   Patient will report pain-free sexual intercourse with penile penetration in 8 weeks.   Patient will report Female NIH-CPSI <5/44 in 8 weeks.   Patient will be independent with HEP.     Isabel Canales PT, DPT  "

## 2024-11-12 ENCOUNTER — TREATMENT (OUTPATIENT)
Dept: PHYSICAL THERAPY | Facility: CLINIC | Age: 24
End: 2024-11-12
Payer: MEDICAID

## 2024-11-12 DIAGNOSIS — M62.89 HIGH-TONE PELVIC FLOOR DYSFUNCTION: Primary | ICD-10-CM

## 2024-11-12 DIAGNOSIS — N94.10 DYSPAREUNIA, FEMALE: ICD-10-CM

## 2024-11-12 PROCEDURE — 97112 NEUROMUSCULAR REEDUCATION: CPT | Mod: GP

## 2024-11-12 PROCEDURE — 97110 THERAPEUTIC EXERCISES: CPT | Mod: GP

## 2024-11-12 ASSESSMENT — PAIN SCALES - GENERAL: PAINLEVEL_OUTOF10: 1

## 2024-11-12 NOTE — PROGRESS NOTES
Physical Therapy    Physical Therapy Treatment    Patient Name: Corin Schneider  MRN: [id number]  Today's Date: 11/12/2024    Time Entry:   Time Calculation  Start Time: 1430  Stop Time: 1527  Time Calculation (min): 57 min     PT Therapeutic Procedures Time Entry  Neuromuscular Re-Education Time Entry: 30  Therapeutic Exercise Time Entry: 27                   Current Problem  1. High-tone pelvic floor dysfunction [M62.89]        2. Dyspareunia, female [N94.10]              Subjective    Having less pain with intercourse, not sore by the time we're done.     75-80% better overall  Tried a position that was normally painful during intercourse that was fine this week    Have continued to monitor spot on vulva that we found last week. Do have visit with OBGYN 01/07    Precautions  Precautions  STEADI Fall Risk Score (The score of 4 or more indicates an increased risk of falling): 0  Precautions Comment: Hx of POTS, migraine, fibromyalgia  Pain  Pain Assessment  0-10 (Numeric) Pain Score: 1    Objective     Noted <.5cm size brown/black spot along right vulvar tissue adjacent to posterior fourchette of vaginal opening, irregular borders, not raised, no discharge bleeding or redness. CHANGE- PAIN WITH PALPATION TODAY INTO VULVAR SPOT.    Treatments:    TREATMENT  Educated patient on course of treatment.   Discussed PFM and high tension PFM dysfunction.       NEURO RE-EDUCATION: x2   Biofeedback with rectal sensor; inserted by therapist gloves donned. Cued patient to inhale during insertion.  Baseline: diaphragmatic breathing focus on lengthening pelvic floor on inhale.  30 Second Work, 30 Second Rest; work is lengthening actively, rest is relaxing. Patient tolerates well focusing on improved coordination of pelvic floor to relax and bulge.     THERAPEUTIC EXERCISE: x2  Child's pose rocking back from quadruped; 30 reps.   Butterfly adductor stretch in supine; 5 minute hold.  Patient Education:    Discussed importance of  "continued to monitoring of vulvar \"spot\".  Changes to HEP.   All questions answered by patient.      OP Education: HEP: Patient verbalizes and demonstrates understanding of home exercises. Patient will contact writer if with questions or if condition worsens.      Access Code: [email or web address]  URL: [email or web address]  Date: 11/05/2024  Prepared by: Marilou Canales    Home Exercises:  - Supine Pelvic Floor Stretch  - 1 x daily - 7 x weekly - 1 sets - 10 reps - 30 second hold  - Deep Squat with Pelvic Floor Relaxation  - 1 x daily - 7 x weekly - 1 sets - 10 reps - 20 seconds hold  - Diaphragmatic Breathing in Supported Child's Pose with Pelvic Floor Relaxation  - 1 x daily - 7 x weekly - 1 sets - 1 reps - 5 minute hold  - Modified Adama Stretch  - 1x daily - 7 x weekly - 1 sets - 10 reps - 60 second hold  - Lower Trunk Rotations  - 1 x daily - 7 x weekly - 3 sets - 10 reps     Charges: [id number], [id number]     Outcome Measures:  Other Measures  Other Outcome Measures: Female NIH-CPSI: 19/44      Assessment:   PT Diagnosis (Initial Evaluation 10/15/2024): Patient presents with pain, decreased mobility, strength, and function of pelvic floor muscles secondary to signs/symptoms suggestive of high tone pelvic floor dysfunction. Skilled PT required to maximize functional outcome. Likely to benefit from skilled care to address concerns with focus on deep breathing/relaxation, internal/external stretching, and other modalities as needed.     Today: Patient progressing well and as expected. Patient continues to present with vulvar \"spot\" and now increased pain seemingly unrelated to high tension pelvic floor upon palpation. Will be contacting physician again to report concerns. Patient continues to benefit from skilled care to address pelvic physical therapy related dysfunction.      Plan:  Treatment/Interventions: Aquatic therapy, Biofeedback, Cryotherapy, Dry needling, Education/ Instruction, Electrical " "stimulation, Hot pack, Manual therapy, Neuromuscular re-education, Self care/ home management, Therapeutic activities, Therapeutic exercises, Taping techniques  PT Plan: Skilled PT  PT Frequency: 1 time per week  Duration: 8 weeks  Rehab Potential: Good  Plan of Care Agreement: Patient     Next Visit Plan: Progress as appropriate and tolerated.    Message to OBGYN:  \"\"Good morning, my name is Isabel I'm a pelvic floor therapist working with mutual patient Reyna Schneider. She is progressing well in pelvic PT with work on high tension pelvic floor and muscle weakness. Our last visit she reported strange spot noted on inner vulvar tissue, I observed spot and recommended she contact you as soon as possible to get area further inspected/evaluated. I'm concerned with how this area looks. \"Noted <.5cm size brown/black spot along right vulvar tissue adjacent to posterior fourchette of vaginal opening, irregular borders, not raised, no discharge bleeding or redness. No pain with palpation of skin spot +TTP with palpation of perirenal muscle body.\" Just giving heads up she will be contacting your office shortly. Thanks! -Isabel\"\"     Goals: update as needed  Patient will report confidence and independence with dilator training at home in 2 weeks.   Patient will report BM at least every 2 days in 8 weeks.   Patient will report pain-free sexual intercourse with penile penetration in 8 weeks.   Patient will report Female NIH-CPSI <5/44 in 8 weeks.   Patient will be independent with HEP.     Isabel Canales PT, DPT  "

## 2024-11-19 ENCOUNTER — TREATMENT (OUTPATIENT)
Dept: PHYSICAL THERAPY | Facility: CLINIC | Age: 24
End: 2024-11-19
Payer: MEDICAID

## 2024-11-19 DIAGNOSIS — M62.89 HIGH-TONE PELVIC FLOOR DYSFUNCTION: Primary | ICD-10-CM

## 2024-11-19 PROCEDURE — 97140 MANUAL THERAPY 1/> REGIONS: CPT | Mod: GP

## 2024-11-19 PROCEDURE — 97110 THERAPEUTIC EXERCISES: CPT | Mod: GP

## 2024-11-19 ASSESSMENT — PAIN SCALES - GENERAL: PAINLEVEL_OUTOF10: 1

## 2024-11-19 NOTE — PROGRESS NOTES
Physical Therapy    Physical Therapy Treatment    Patient Name: Corin Schneider  MRN: [id number]  Today's Date: 11/19/2024    Time Entry:   Time Calculation  Start Time: 1420  Stop Time: 1508  Time Calculation (min): 48 min     PT Therapeutic Procedures Time Entry  Manual Therapy Time Entry: 10  Therapeutic Exercise Time Entry: 38                   Current Problem  1. High-tone pelvic floor dysfunction [M62.89]              Subjective    Trying to get on husbands insurance.   Did the butterfly but I noticed the hip has been bothering me since David.   Pain has been good, one incident where we did have intercourse.     Precautions  Precautions  STEADI Fall Risk Score (The score of 4 or more indicates an increased risk of falling): 0  Precautions Comment: Hx of POTS, migraine, fibromyalgia  Pain  Pain Assessment  0-10 (Numeric) Pain Score: 1    Objective   Limited thoracic spine extension    TREATMENT  Educated patient on course of treatment.   Discussed PFM and high tension PFM dysfunction.     MANUAL: x1  Grade II PA mob L4-L5; 5 minutes for pain relief and mobility.   Grade V thrust T10-T12; capitation produced; patient in prone; patient reports relief.   Moist heat applied before.     After manual  THERAPEUTIC EXERCISE: x2  Lower trunk rotations; 30 reps.   Side lying thoracic rotation with open book; 3 x 10 reps.   Thoracic extension over a foam roll; breath in relax, exhale and gentle tighten lower core; 20 reps.   Update HEP. Discussed correlation between thoracic spine, lumbar spine, paraspinals and pelvic floor tension.       OP Education: HEP: Patient verbalizes and demonstrates understanding of home exercises. Patient will contact writer if with questions or if condition worsens.      Access Code: [email or web address]  URL: [email or web address]  Date: 11/19/2024  Prepared by: Marilou Canales    Exercises  - Supine Pelvic Floor Stretch  - 1 x daily - 7 x weekly - 1 sets - 10 reps - 30 second hold  -  "Deep Squat with Pelvic Floor Relaxation  - 1 x daily - 7 x weekly - 1 sets - 10 reps - 20 seconds hold  - Diaphragmatic Breathing in Supported Child's Pose with Pelvic Floor Relaxation  - 1 x daily - 7 x weekly - 1 sets - 1 reps - 5 minute hold  - Modified Adama Stretch  - 1 x daily - 7 x weekly - 1 sets - 10 reps - 60 second hold  - Lower Trunk Rotations  - 1 x daily - 7 x weekly - 3 sets - 10 reps  - Supported Butterfly Stretch with Pelvic Floor Relaxation  - 1 x daily - 7 x weekly - 1 sets - 1 reps - 5 minutes hold  - Child's Pose Stretch  - 1 x daily - 7 x weekly - 1 sets - 10 reps  - Sidelying Thoracic Rotation with Open Book  - 1 x daily - 7 x weekly - 3 sets - 10 reps     Charges: [id number], 97110x2       Outcome Measures:  Other Measures  Other Outcome Measures: Female NIH-CPSI: 19/44      Assessment:   PT Diagnosis (Initial Evaluation 10/15/2024): Patient presents with pain, decreased mobility, strength, and function of pelvic floor muscles secondary to signs/symptoms suggestive of high tone pelvic floor dysfunction. Skilled PT required to maximize functional outcome. Likely to benefit from skilled care to address concerns with focus on deep breathing/relaxation, internal/external stretching, and other modalities as needed.     Today: Patient progressing well and as expected. Patient continues to present with vulvar \"spot\" and now increased pain seemingly unrelated to high tension pelvic floor upon palpation. Will be contacting physician again to report concerns. Patient reports relives and benefits from techniques to decrease tension up the chain from pelvic floor in upper and lower back. Patient continues to benefit from skilled care to address pelvic physical therapy related dysfunction.      Plan:  Treatment/Interventions: Aquatic therapy, Biofeedback, Cryotherapy, Dry needling, Education/ Instruction, Electrical stimulation, Hot pack, Manual therapy, Neuromuscular re-education, Self care/ home management, " Therapeutic activities, Therapeutic exercises, Taping techniques  PT Plan: Skilled PT  PT Frequency: 1 time per week  Duration: 8 weeks  Rehab Potential: Good  Plan of Care Agreement: Patient     Next Visit Plan: Progress as appropriate and tolerated.      Goals: update as needed  Patient will report confidence and independence with dilator training at home in 2 weeks.   Patient will report BM at least every 2 days in 8 weeks.   Patient will report pain-free sexual intercourse with penile penetration in 8 weeks.   Patient will report Female NIH-CPSI <5/44 in 8 weeks.   Patient will be independent with HEP.     Isabel Canales PT, DPT

## 2024-11-25 ENCOUNTER — TREATMENT (OUTPATIENT)
Dept: PHYSICAL THERAPY | Facility: CLINIC | Age: 24
End: 2024-11-25
Payer: MEDICAID

## 2024-11-25 DIAGNOSIS — N94.10 DYSPAREUNIA, FEMALE: ICD-10-CM

## 2024-11-25 PROCEDURE — 97110 THERAPEUTIC EXERCISES: CPT | Mod: GP

## 2024-11-25 PROCEDURE — 97140 MANUAL THERAPY 1/> REGIONS: CPT | Mod: GP

## 2024-11-25 ASSESSMENT — PAIN SCALES - GENERAL: PAINLEVEL_OUTOF10: 1

## 2024-11-25 NOTE — PROGRESS NOTES
Physical Therapy    Physical Therapy Treatment    Patient Name: Corin Schneider  MRN: [id number]  Today's Date: 11/25/2024    Time Entry:   Time Calculation  Start Time: 1731  Stop Time: 1820  Time Calculation (min): 49 min     PT Therapeutic Procedures Time Entry  Manual Therapy Time Entry: 15  Therapeutic Exercise Time Entry: 34                   Current Problem  1. Dyspareunia, female  Follow Up In Physical Therapy          Subjective    I wasn't too good with exercises the past week.   Tried to have intercourse, but couldn't because of pain. Penetration soreness was better.     Lower back pain with sitting side ways, mainly in the lower back pain.   Lower back pain last night.     Hip is still hurting with walking; right hip. Hip flexor region.     Medicaid runs out end of November    Precautions  Precautions  STEADI Fall Risk Score (The score of 4 or more indicates an increased risk of falling): 0  Precautions Comment: Hx of POTS, migraine, fibromyalgia  Pain  Pain Assessment  0-10 (Numeric) Pain Score: 1    Objective   Limited thoracic spine extension    TREATMENT  Educated patient on course of treatment.   Discussed PFM and high tension PFM dysfunction.     MANUAL: x1  Right hip flexor, patient in supine legs out straight, IASTM release. 15 minutes  Moist heat applied before.       THERAPEUTIC EXERCISE: x2  Hip flexor isometrics, knees bent up; 10 second hold against hand 10-25% effort (gentle); 2 x 20 reps.   Lower trunk rotations; 3 x 10 reps.   Patient Education:  Discussed plan until next PT visit.   Answered all questions.       OP Education: HEP: Patient verbalizes and demonstrates understanding of home exercises. Patient will contact writer if with questions or if condition worsens.      Access Code: [email or web address]  URL: [email or web address]  Date: 11/19/2024  Prepared by: Marilou Canales    Exercises  - Supine Pelvic Floor Stretch  - 1 x daily - 7 x weekly - 1 sets - 10 reps - 30 second  hold  - Deep Squat with Pelvic Floor Relaxation  - 1 x daily - 7 x weekly - 1 sets - 10 reps - 20 seconds hold  - Diaphragmatic Breathing in Supported Child's Pose with Pelvic Floor Relaxation  - 1 x daily - 7 x weekly - 1 sets - 1 reps - 5 minute hold  - Modified Adama Stretch  - 1 x daily - 7 x weekly - 1 sets - 10 reps - 60 second hold  - Lower Trunk Rotations  - 1 x daily - 7 x weekly - 3 sets - 10 reps  - Supported Butterfly Stretch with Pelvic Floor Relaxation  - 1 x daily - 7 x weekly - 1 sets - 1 reps - 5 minutes hold  - Child's Pose Stretch  - 1 x daily - 7 x weekly - 1 sets - 10 reps  - Sidelying Thoracic Rotation with Open Book  - 1 x daily - 7 x weekly - 3 sets - 10 reps     Charges: [id number], [id number]       Outcome Measures:  Other Measures  Other Outcome Measures: Female NIH-CPSI: 19/44      Assessment:   PT Diagnosis (Initial Evaluation 10/15/2024): Patient presents with pain, decreased mobility, strength, and function of pelvic floor muscles secondary to signs/symptoms suggestive of high tone pelvic floor dysfunction. Skilled PT required to maximize functional outcome. Likely to benefit from skilled care to address concerns with focus on deep breathing/relaxation, internal/external stretching, and other modalities as needed.     Today: Patient continues to present with right hip flexor pain ever since trip to [facility] with lots of walking. Patient demonstrates signs/symptoms of mild hip flexor strain benefiting from gentle STM and isometrics to reduce discomfort. Slight regression noted with pelvic pain progress due to onset of hip pain. Overall, patient pelvic symptoms are still considerably better since initial visit. Patient is able to have intercourse relatively pain free. Patient continues to benefit from skilled care and will benefit from future session to assess and advance progress.       Plan:  Treatment/Interventions: Aquatic therapy, Biofeedback, Cryotherapy, Dry needling, Education/  Instruction, Electrical stimulation, Hot pack, Manual therapy, Neuromuscular re-education, Self care/ home management, Therapeutic activities, Therapeutic exercises, Taping techniques  PT Plan: Skilled PT  PT Frequency: 1 time per week  Duration: 8 weeks  Rehab Potential: Good  Plan of Care Agreement: Patient     Next Visit Plan: Progress as appropriate and tolerated. Will follow up in 1 month when patient has insurance coverage, will see sooner if needed. Patient will remain in contact with provider through  email.       Goals: update as needed  Patient will report confidence and independence with dilator training at home in 2 weeks.   Patient will report BM at least every 2 days in 8 weeks.   Patient will report pain-free sexual intercourse with penile penetration in 8 weeks.   Patient will report Female NIH-CPSI <5/44 in 8 weeks.   Patient will be independent with HEP.     Isabel Canales PT, DPT

## 2024-12-03 ENCOUNTER — APPOINTMENT (OUTPATIENT)
Dept: OBSTETRICS AND GYNECOLOGY | Facility: CLINIC | Age: 24
End: 2024-12-03
Payer: MEDICAID

## 2024-12-03 VITALS
SYSTOLIC BLOOD PRESSURE: 121 MMHG | DIASTOLIC BLOOD PRESSURE: 84 MMHG | BODY MASS INDEX: 21.7 KG/M2 | WEIGHT: 138.25 LBS | HEIGHT: 67 IN

## 2024-12-03 DIAGNOSIS — Z30.42 ENCOUNTER FOR MANAGEMENT AND INJECTION OF DEPO-PROVERA: Primary | ICD-10-CM

## 2024-12-03 PROCEDURE — 3008F BODY MASS INDEX DOCD: CPT | Performed by: ADVANCED PRACTICE MIDWIFE

## 2024-12-03 PROCEDURE — 96372 THER/PROPH/DIAG INJ SC/IM: CPT | Performed by: ADVANCED PRACTICE MIDWIFE

## 2024-12-03 RX ORDER — MEDROXYPROGESTERONE ACETATE 150 MG/ML
150 INJECTION, SUSPENSION INTRAMUSCULAR ONCE
Status: COMPLETED | OUTPATIENT
Start: 2024-12-03 | End: 2024-12-03

## 2024-12-03 NOTE — PROGRESS NOTES
Pt. here today for Depo Provera injection  Last Depo 9/17/2024  Next Depo due 2/18-3/4/2025  Last RUDY 6/25/2024  UPT n/a  LMP n/a  Pt. given Depo Provera without difficulties.  Patient brought in.  Pt. tolerated injection well.  Education offered.

## 2024-12-12 NOTE — PROGRESS NOTES
Division of Minimally Invasive Gynecologic Surgery  [facility]    12/18/24 Gynecology Visit    CC:   Chief Complaint   Patient presents with    Vaginitis/Bacterial Vaginosis     Declined chaperone  Reviewed and approved by LOUISE BHAKTA on 12/18/24 at 11:40 AM.           Corin Rhodes is a 24 y.o. who presents to discuss a dark spot on her vulva    Pt first noted in in October. States it is a small dark spot on her left vulva. No pain, drainage or mass felt. She has not noticed it increased in size but would like it checked out     GI symptoms: IBS largely in remission  Urinary symptoms: none  Sexual activity: yes, has pain with initial and deep penetration. Vaginal walls feel tender. Occasionally has deeper pelvic pain.     Prior Therapies: depo. OCPs-stopped due to migraines    Imaging:   --TVUS: 7.9 x 3 x 5.3 cm uterus. There is mild diffuse heterogeneity of the uterine  myometrium with some increased vascularity possibly represent  adenomyosis. Normal adnexa  Labs: n/a     GYN Hx  Gynecologic history:  Contraception/menstrual regulation: Depo  Desires Childbearing: possibly  Last pap: NILM neg HPV 2023    OBHx: G0  Pertinent PMH: POTs, IBS  Pertient PSH: no surgeries    PMHx, PSHx, SHx, Allergies, and Medications updated in Epic.  Past Medical History:   Diagnosis Date    Migraine     Urinary tract infection, site not specified     UTI (lower urinary tract infection)     Past Surgical History:   Procedure Laterality Date    COLPOSCOPY  July 2019     Allergies   Allergen Reactions    Midodrine Shortness of breath    Propranolol Chills, Other and Shortness of breath    Amoxicillin Unknown    Nitroglycerin Unknown    Penicillins Unknown    Sulfa (Sulfonamide Antibiotics) Unknown    Sulfamethoxazole-Trimethoprim Unknown       Current Outpatient Medications:     albuterol 90 mcg/actuation inhaler, Inhale 2 puffs every 4 hours if needed for wheezing., Disp: , Rfl:      cetirizine (ZyrTEC) 5 mg tablet, Take 2 tablets (10 mg) by mouth once daily., Disp: , Rfl:     multivitamin tablet, Take by mouth., Disp: , Rfl:     medroxyPROGESTERone 150 mg/mL injection, Inject 1 mL (150 mg) into the muscle every 12 weeks., Disp: 1 mL, Rfl: 3    SUMAtriptan (Imitrex) 100 mg tablet, TAKE 1 TABLET AT ONSET OF MIGRAINE HEADACHE. MAY REPEAT IN 2 HOURS IF NEEDED. (Patient not taking: Reported on 12/18/2024), Disp: , Rfl:   Social History     Socioeconomic History    Marital status:      Spouse name: Not on file    Number of children: Not on file    Years of education: Not on file    Highest education level: Not on file   Occupational History    Not on file   Tobacco Use    Smoking status: Never     Passive exposure: Never    Smokeless tobacco: Never   Vaping Use    Vaping status: Some Days    Substances: CBD   Substance and Sexual Activity    Alcohol use: Never    Drug use: Never    Sexual activity: Yes     Partners: Male     Birth control/protection: Injection   Other Topics Concern    Not on file   Social History Narrative    Not on file     Social Drivers of Health     Financial Resource Strain: Not on file   Food Insecurity: Not on file   Transportation Needs: Not on file   Physical Activity: Not on file   Stress: Not on file   Social Connections: Not on file   Intimate Partner Violence: Not on file   Housing Stability: Not on file     Family History   Problem Relation Name Age of Onset    Cholelithiasis Mother Shameka     Heart disease Mother Shameka     Other (gastroesophageal reflux disease) Father      Other (malignant neoplam of colon) Paternal Grandmother      Blood clot Maternal Grandfather Art     Heart disease Maternal Grandfather Art     Diabetes Maternal Grandmother Virginia     Heart disease Maternal Grandmother Virginia     Breast cancer Mother's Sister Skyla     Cancer Mother's Sister Skyla     Diabetes Mother's Sister Eryn     Heart disease Mother's Sister Eryn     Heart  disease Mother's Brother Martin      OB History          0    Para   0    Term   0       0    AB   0    Living   0         SAB   0    IAB   0    Ectopic   0    Multiple   0    Live Births   0                    ROS: reviewed and negative    PE:/78 (BP Location: Right arm, Patient Position: Sitting, BP Cuff Size: Adult)   Wt 62.6 kg (138 lb)   LMP  (LMP Unknown)   BMI 21.61 kg/m²   Gen: NAD  Psych: AAOx3  Skin: no lesions  Pulm: nonlabored breathing, normal respiratory effort  Cards: normal peripheral perfusion  Lymph: no LAD in axilla or groin  GI: soft, non-tender, non-distended, no rebound/guarding, no masses  : at junction of left labia minora/majora there is a small 0.5 cm flat, black/brown lesion. Nonraised, nontender. Irregular border    Prior exam:  :  External Genitalia: vulva without lesions, normal hair distribution  Urethral meatus: normal size and without massesBladder: non-tender, no masses or tenderness  Vagina: normal discharge, no lesions or masses. Significant tenderness with palpation of perineal body, right puborectalis, pubococcygeus, iliococcygeus and obturator internus. Contracted hypertonic bands. Left levator tenderness significant less than right side. Negative qtip test  Cervix: without discharge or lesions, no cervical masses, no CMT  Uterus: mildly ttp, mobile, normal sized  Adnexa: non tender and not enlarged  Anus/Perineum: normal appearance      A/P: Corin Rhodes is a 24 y.o. with small hyperpigmented lesion on left vulva    - discussed this is likely a benign lesions like vulvar melanosis or benign nevus. However cannot say definitively without biopsy  - will refer to dermatology for 2nd opinion. If it needs to be removed, can return to gyn for biopsy/excision      Ladonna Sepulveda MD  Division of Minimally Invasive Gynecologic Surgery  [facility]

## 2024-12-18 ENCOUNTER — APPOINTMENT (OUTPATIENT)
Dept: OBSTETRICS AND GYNECOLOGY | Facility: CLINIC | Age: 24
End: 2024-12-18
Payer: MEDICAID

## 2024-12-18 VITALS — SYSTOLIC BLOOD PRESSURE: 125 MMHG | WEIGHT: 138 LBS | DIASTOLIC BLOOD PRESSURE: 78 MMHG | BODY MASS INDEX: 21.61 KG/M2

## 2024-12-18 DIAGNOSIS — N94.10 DYSPAREUNIA, FEMALE: ICD-10-CM

## 2024-12-18 DIAGNOSIS — N90.89 VULVAL LESION: Primary | ICD-10-CM

## 2024-12-18 PROCEDURE — 1036F TOBACCO NON-USER: CPT | Performed by: STUDENT IN AN ORGANIZED HEALTH CARE EDUCATION/TRAINING PROGRAM

## 2024-12-18 PROCEDURE — 99213 OFFICE O/P EST LOW 20 MIN: CPT | Performed by: STUDENT IN AN ORGANIZED HEALTH CARE EDUCATION/TRAINING PROGRAM

## 2024-12-18 ASSESSMENT — PAIN SCALES - GENERAL: PAINLEVEL_OUTOF10: 0-NO PAIN

## 2024-12-18 ASSESSMENT — PATIENT HEALTH QUESTIONNAIRE - PHQ9
SUM OF ALL RESPONSES TO PHQ9 QUESTIONS 1 & 2: 0
1. LITTLE INTEREST OR PLEASURE IN DOING THINGS: NOT AT ALL
2. FEELING DOWN, DEPRESSED OR HOPELESS: NOT AT ALL

## 2024-12-18 ASSESSMENT — LIFESTYLE VARIABLES: HOW OFTEN DO YOU HAVE A DRINK CONTAINING ALCOHOL: NEVER

## 2025-01-07 ENCOUNTER — APPOINTMENT (OUTPATIENT)
Dept: OBSTETRICS AND GYNECOLOGY | Facility: CLINIC | Age: 25
End: 2025-01-07
Payer: MEDICAID